# Patient Record
Sex: MALE | Race: WHITE | NOT HISPANIC OR LATINO | URBAN - METROPOLITAN AREA
[De-identification: names, ages, dates, MRNs, and addresses within clinical notes are randomized per-mention and may not be internally consistent; named-entity substitution may affect disease eponyms.]

---

## 2020-08-17 ENCOUNTER — APPOINTMENT (OUTPATIENT)
Dept: GENERAL RADIOLOGY | Facility: HOSPITAL | Age: 55
End: 2020-08-17

## 2020-08-17 ENCOUNTER — HOSPITAL ENCOUNTER (EMERGENCY)
Facility: HOSPITAL | Age: 55
Discharge: HOME | End: 2020-08-17
Attending: EMERGENCY MEDICINE | Admitting: EMERGENCY MEDICINE

## 2020-08-17 VITALS
OXYGEN SATURATION: 97 % | HEART RATE: 80 BPM | TEMPERATURE: 96.8 F | RESPIRATION RATE: 17 BRPM | HEIGHT: 67 IN | BODY MASS INDEX: 29.82 KG/M2 | SYSTOLIC BLOOD PRESSURE: 135 MMHG | DIASTOLIC BLOOD PRESSURE: 95 MMHG | WEIGHT: 190 LBS

## 2020-08-17 DIAGNOSIS — S61.211A LACERATION OF LEFT INDEX FINGER WITHOUT FOREIGN BODY WITHOUT DAMAGE TO NAIL, INITIAL ENCOUNTER: Primary | ICD-10-CM

## 2020-08-17 PROCEDURE — 25010000002 TDAP 5-2.5-18.5 LF-MCG/0.5 SUSPENSION: Performed by: PHYSICIAN ASSISTANT

## 2020-08-17 PROCEDURE — 99283 EMERGENCY DEPT VISIT LOW MDM: CPT

## 2020-08-17 PROCEDURE — 90471 IMMUNIZATION ADMIN: CPT | Performed by: PHYSICIAN ASSISTANT

## 2020-08-17 PROCEDURE — 25010000003 LIDOCAINE 1 % SOLUTION: Performed by: PHYSICIAN ASSISTANT

## 2020-08-17 PROCEDURE — 90715 TDAP VACCINE 7 YRS/> IM: CPT | Performed by: PHYSICIAN ASSISTANT

## 2020-08-17 PROCEDURE — 73140 X-RAY EXAM OF FINGER(S): CPT

## 2020-08-17 RX ORDER — LIDOCAINE HYDROCHLORIDE 10 MG/ML
10 INJECTION, SOLUTION INFILTRATION; PERINEURAL ONCE
Status: COMPLETED | OUTPATIENT
Start: 2020-08-17 | End: 2020-08-17

## 2020-08-17 RX ORDER — CEPHALEXIN 500 MG/1
500 CAPSULE ORAL 4 TIMES DAILY
Qty: 40 CAPSULE | Refills: 0 | Status: SHIPPED | OUTPATIENT
Start: 2020-08-17

## 2020-08-17 RX ADMIN — LIDOCAINE HYDROCHLORIDE 10 ML: 10 INJECTION, SOLUTION INFILTRATION; PERINEURAL at 15:12

## 2020-08-17 RX ADMIN — TETANUS TOXOID, REDUCED DIPHTHERIA TOXOID AND ACELLULAR PERTUSSIS VACCINE, ADSORBED 0.5 ML: 5; 2.5; 8; 8; 2.5 SUSPENSION INTRAMUSCULAR at 13:00

## 2020-08-17 NOTE — ED NOTES
Patient masked in triage and taken to room.  Left index finger injury with chainsaw.  Patient injured the finger 2 hours PTA.  Bleeding is controlled currently, not on blood thinners.     Dylan Go RN  08/17/20 1023

## 2020-08-17 NOTE — ED NOTES
Pt was cutting wood with a chainsaw approximately 2 hours ago and cut L index finger with chainsaw. Lac noted to L index finger, bleeding is controlled, gauze applied. Denies any numbness. Able to move finger. Denies taking blood thinners. Last tetanus shot was approximately 10 years ago.      Pt wearing mask and RN wearing mask and eyewear throughout encounter.        Radha Alvarado, RN  08/17/20 8772

## 2020-08-17 NOTE — ED PROVIDER NOTES
I supervised care provided by the midlevel provider.   We have discussed this patient's history, physical exam, and treatment plan.  I have reviewed the note and personally saw and examined the patient and agree with the plan of care.   I have seen and evaluated this gentleman.  He was working with a chainsaw and caused laceration to his left index finger.  He has no motor or sensory changes.  He is got good movement.  Patient is not on any blood thinning medicine.    GENERAL: not distressed  HENT: nares patent  Head/neck/ face are symmetric without gross deformity or swelling  EYES: no scleral icterus  CV: regular rhythm, regular rate with intact distal pulses  RESPIRATORY: normal effort and no respiratory distress  ABDOMEN: soft and non-tender  MUSCULOSKELETAL: To his left index finger he is got a curvilinear laceration with irregular borders.  He is got good flexion and extension at his MCP, PIP and DIP joint.  He has no sensory impairment with palpation.  Capillary refill is less than 2 seconds and there is no bleeding on my exam.  NEURO: alert and appropriate, moves all extremities, follows commands  SKIN: warm, dry    Vital signs and nursing notes reviewed.    Plan x-rays were obtained.  We will look at those x-rays.  I discussed with the midlevel provider and the patient.  We will irrigate the wound very well and place him on antibiotics and have him follow-up with hand surgeon.  I anticipate he has no tendon or nerve injury as he has no motor or sensory impairment.  All questions answered    We are currently under a pandemic from the COVID19 infection.  The patient presented to the emergency department by ambulance or personal vehicle.  During current hospital restrictions no other visitors were present in the emergency department during my evaluation and treatment. I followed the current protocols required by Infection Control at [facility] in my evaluation and treatment of the patient. The  patient was wearing a face mask during my evaluation and throughout my encounter. During my whole encounter with this patient I used appropriate personal protective equipment.  This equipment consisted of eye protection, facemask, gown, and gloves.  I applied this equipment before entering the room.    I reviewed the x-rays and reviewed the radiologist report.  There is that radio opaque material in there.  No definitive fracture seen.  Talattristan Martínez the midlevel extensively irrigated and explored the laceration.  Approximated the wound edges.  Placed on antibiotics and will follow-up with a hand surgeon Dr. Bowers.       Brian Gould MD  08/17/20 7000